# Patient Record
Sex: FEMALE | Race: WHITE
[De-identification: names, ages, dates, MRNs, and addresses within clinical notes are randomized per-mention and may not be internally consistent; named-entity substitution may affect disease eponyms.]

---

## 2018-03-06 ENCOUNTER — HOSPITAL ENCOUNTER (OUTPATIENT)
Dept: HOSPITAL 92 - BICMAMMO | Age: 48
Discharge: HOME | End: 2018-03-06
Attending: OBSTETRICS & GYNECOLOGY
Payer: COMMERCIAL

## 2018-03-06 DIAGNOSIS — Z12.31: Primary | ICD-10-CM

## 2018-03-06 PROCEDURE — 77063 BREAST TOMOSYNTHESIS BI: CPT

## 2018-03-06 PROCEDURE — 77067 SCR MAMMO BI INCL CAD: CPT

## 2018-03-20 ENCOUNTER — HOSPITAL ENCOUNTER (OUTPATIENT)
Dept: HOSPITAL 92 - BICMAMMO | Age: 48
Discharge: HOME | End: 2018-03-20
Attending: OBSTETRICS & GYNECOLOGY
Payer: COMMERCIAL

## 2018-03-20 DIAGNOSIS — N63.20: Primary | ICD-10-CM

## 2018-03-20 PROCEDURE — G0279 TOMOSYNTHESIS, MAMMO: HCPCS

## 2018-03-29 ENCOUNTER — HOSPITAL ENCOUNTER (OUTPATIENT)
Dept: HOSPITAL 92 - BICULT | Age: 48
End: 2018-03-29
Attending: OBSTETRICS & GYNECOLOGY
Payer: COMMERCIAL

## 2018-03-29 DIAGNOSIS — D24.2: Primary | ICD-10-CM

## 2018-03-29 PROCEDURE — 19083 BX BREAST 1ST LESION US IMAG: CPT

## 2018-03-29 PROCEDURE — 88305 TISSUE EXAM BY PATHOLOGIST: CPT

## 2018-03-29 PROCEDURE — 0HBU3ZX EXCISION OF LEFT BREAST, PERCUTANEOUS APPROACH, DIAGNOSTIC: ICD-10-PCS | Performed by: OBSTETRICS & GYNECOLOGY

## 2018-10-25 ENCOUNTER — HOSPITAL ENCOUNTER (OUTPATIENT)
Dept: HOSPITAL 92 - SCSULT | Age: 48
Discharge: HOME | End: 2018-10-25
Attending: INTERNAL MEDICINE
Payer: COMMERCIAL

## 2018-10-25 DIAGNOSIS — B96.81: ICD-10-CM

## 2018-10-25 DIAGNOSIS — K80.20: ICD-10-CM

## 2018-10-25 DIAGNOSIS — R14.0: Primary | ICD-10-CM

## 2018-10-25 PROCEDURE — 76705 ECHO EXAM OF ABDOMEN: CPT

## 2018-10-25 NOTE — ULT
RIGHT UPPER QUADRANT ULTRASOUND: 

 

Date: 10-25-18 

 

Comparison: None. 

 

History: Abdominal distention. 

 

Technique: Multiplanar grayscale sonographic imaging of the right upper quadrant provided. 

 

FINDINGS: 

Imaged pancreas is grossly unremarkable. The distal body and tail are obscured by bowel gas. No focal
 liver lesion or intrahepatic biliary dilatation is seen. 

 

The common bile duct measures approximately 4 mm, within normal limits. 

 

Extensive shadowing is seen in the region of the gallbladder suggesting a gallbladder filled with mul
tiple stones. The sonographer reports a negative Mak's sign. No gallbladder wall thickening or per
icholecystic fluid. 

 

Right kidney measures 12.4 cm craniocaudal dimension and demonstrates no stone, hydronephrosis or mas
s. 

 

IMPRESSION: 

Numerous gallstones fill the gallbladder. No sonographic evidence of acute cholecystitis or biliary d
ilatation. 

 

POS: [facility]

## 2018-12-10 ENCOUNTER — HOSPITAL ENCOUNTER (OUTPATIENT)
Dept: HOSPITAL 92 - LABBT | Age: 48
Discharge: HOME | End: 2018-12-10
Attending: SURGERY
Payer: COMMERCIAL

## 2018-12-10 DIAGNOSIS — Z01.812: Primary | ICD-10-CM

## 2018-12-10 DIAGNOSIS — K80.20: ICD-10-CM

## 2018-12-10 LAB
ALBUMIN SERPL BCG-MCNC: 4.1 G/DL (ref 3.5–5)
ALP SERPL-CCNC: 81 U/L (ref 40–150)
ALT SERPL W P-5'-P-CCNC: 9 U/L (ref 8–55)
ANION GAP SERPL CALC-SCNC: 10 MMOL/L (ref 10–20)
AST SERPL-CCNC: 16 U/L (ref 5–34)
BASOPHILS # BLD AUTO: 0.1 THOU/UL (ref 0–0.2)
BASOPHILS NFR BLD AUTO: 1.2 % (ref 0–1)
BILIRUB DIRECT SERPL-MCNC: 0.2 MG/DL (ref 0.1–0.3)
BILIRUB SERPL-MCNC: 0.4 MG/DL (ref 0.2–1.2)
BUN SERPL-MCNC: 12 MG/DL (ref 7–18.7)
CALCIUM SERPL-MCNC: 9.3 MG/DL (ref 7.8–10.44)
CHLORIDE SERPL-SCNC: 104 MMOL/L (ref 98–107)
CO2 SERPL-SCNC: 26 MMOL/L (ref 22–29)
CREAT CL PREDICTED SERPL C-G-VRATE: 0 ML/MIN (ref 70–130)
EOSINOPHIL # BLD AUTO: 0.8 THOU/UL (ref 0–0.7)
EOSINOPHIL NFR BLD AUTO: 10.5 % (ref 0–10)
GLUCOSE SERPL-MCNC: 97 MG/DL (ref 70–105)
HGB BLD-MCNC: 10.4 G/DL (ref 12–16)
LYMPHOCYTES # BLD: 2 THOU/UL (ref 1.2–3.4)
LYMPHOCYTES NFR BLD AUTO: 26.8 % (ref 21–51)
MCH RBC QN AUTO: 23.1 PG (ref 27–31)
MCV RBC AUTO: 75 FL (ref 78–98)
MDIFF COMPLETE?: YES
MICROCYTES BLD QL SMEAR: (no result) (100X)
MONOCYTES # BLD AUTO: 0.7 THOU/UL (ref 0.11–0.59)
MONOCYTES NFR BLD AUTO: 9 % (ref 0–10)
NEUTROPHILS # BLD AUTO: 3.8 THOU/UL (ref 1.4–6.5)
NEUTROPHILS NFR BLD AUTO: 52.5 % (ref 42–75)
PLATELET # BLD AUTO: 341 THOU/UL (ref 130–400)
POLYCHROMASIA BLD QL SMEAR: (no result) (100X)
POTASSIUM SERPL-SCNC: 4.3 MMOL/L (ref 3.5–5.1)
PREGS CONTROL BACKGROUND?: (no result)
PREGS CONTROL BAR APPEAR?: YES
RBC # BLD AUTO: 4.51 MILL/UL (ref 4.2–5.4)
SODIUM SERPL-SCNC: 136 MMOL/L (ref 136–145)
WBC # BLD AUTO: 7.3 THOU/UL (ref 4.8–10.8)

## 2018-12-10 PROCEDURE — 85025 COMPLETE CBC W/AUTO DIFF WBC: CPT

## 2018-12-10 PROCEDURE — 84703 CHORIONIC GONADOTROPIN ASSAY: CPT

## 2018-12-10 PROCEDURE — 80048 BASIC METABOLIC PNL TOTAL CA: CPT

## 2018-12-10 PROCEDURE — 80076 HEPATIC FUNCTION PANEL: CPT

## 2018-12-11 ENCOUNTER — HOSPITAL ENCOUNTER (OUTPATIENT)
Dept: HOSPITAL 92 - SDC | Age: 48
Discharge: HOME | End: 2018-12-11
Attending: SURGERY
Payer: COMMERCIAL

## 2018-12-11 VITALS — BODY MASS INDEX: 29.2 KG/M2

## 2018-12-11 DIAGNOSIS — K80.10: Primary | ICD-10-CM

## 2018-12-11 PROCEDURE — S0028 INJECTION, FAMOTIDINE, 20 MG: HCPCS

## 2018-12-11 PROCEDURE — 96374 THER/PROPH/DIAG INJ IV PUSH: CPT

## 2018-12-11 PROCEDURE — 0FT44ZZ RESECTION OF GALLBLADDER, PERCUTANEOUS ENDOSCOPIC APPROACH: ICD-10-PCS | Performed by: SURGERY

## 2018-12-11 PROCEDURE — 88304 TISSUE EXAM BY PATHOLOGIST: CPT

## 2018-12-11 NOTE — OP
DATE OF PROCEDURE:  12/11/2018



PREOPERATIVE DIAGNOSIS:  Symptomatic gallstones.



POSTOPERATIVE DIAGNOSIS:  Symptomatic gallstones.



PROCEDURE PERFORMED:  Laparoscopic cholecystectomy.



ANESTHESIA:  General.



ESTIMATED BLOOD LOSS:  Minimal.



COMPLICATION:  None.



SPECIMEN:  Gallbladder.



FINDINGS:  Chronic cholecystitis.



DESCRIPTION OF PROCEDURE:  The patient was taken to the operating room and laid

supine on the operating room table.  After general anesthetic was obtained, the

abdomen was prepped and draped in a sterile fashion.  A curved incision was made

below the umbilicus.  Cautery was used to dissect down to the umbilical fascia.

Umbilical fascia was incised and held up using a Kocher.  The abdominal cavity was

entered using a Arcelia clamp.  Holding stitch of Vicryl was placed on each side of

the fascia.  Quiñonez trocar was placed.  High-flow pneumoperitoneum was obtained.  An

upper midline 5 mm port and 2 right upper quadrant 5 mm ports were placed under

direct camera visualization.  The gallbladder was retracted from the gallbladder

fossa.  The peritoneum of the gallbladder was opened anteriorly and posteriorly.

The critical view triangle was seen showing only the cystic duct and cystic artery

branching from medial to lateral.  There were no other branching structures.  Two

clips were placed proximally on the cystic duct and one laterally.  It was cut using

laparoscopic scissors.  The cystic artery was taken in the same way.  Electrocautery

was then used to dissect the gallbladder out of the gallbladder fossa.  The

gallbladder was placed in an Endo catch bag and brought out through the Quiñonez.

There was no bleeding or bile in the liver bed.  The cystic duct stump and cystic

artery stump were intact, without evidence of extravasation or bleeding.  All port

sites were infiltrated using local anesthesia.  All ports were removed under camera

visualization.  Pneumoperitoneum was let down.  The Vicryl was used to close the

fascial defect below the umbilicus.  All incisions were irrigated and closed using

4-0 Monocryl and Dermabond.  The patient was en route to Recovery in stable

condition.  All instrument counts, needle counts and lap counts were correct. 







Job ID:  [id number]

## 2019-06-17 ENCOUNTER — HOSPITAL ENCOUNTER (OUTPATIENT)
Dept: HOSPITAL 92 - LABBT | Age: 49
Discharge: HOME | End: 2019-06-17
Attending: OBSTETRICS & GYNECOLOGY
Payer: COMMERCIAL

## 2019-06-17 DIAGNOSIS — N92.0: ICD-10-CM

## 2019-06-17 DIAGNOSIS — D25.9: ICD-10-CM

## 2019-06-17 DIAGNOSIS — Z01.812: Primary | ICD-10-CM

## 2019-06-17 LAB
HGB BLD-MCNC: 12.6 G/DL (ref 12–16)
MCH RBC QN AUTO: 26.4 PG (ref 27–31)
MCV RBC AUTO: 81.4 FL (ref 78–98)
PLATELET # BLD AUTO: 322 THOU/UL (ref 130–400)
RBC # BLD AUTO: 4.76 MILL/UL (ref 4.2–5.4)
WBC # BLD AUTO: 7.5 THOU/UL (ref 4.8–10.8)

## 2019-06-17 PROCEDURE — 86900 BLOOD TYPING SEROLOGIC ABO: CPT

## 2019-06-17 PROCEDURE — 86850 RBC ANTIBODY SCREEN: CPT

## 2019-06-17 PROCEDURE — 85027 COMPLETE CBC AUTOMATED: CPT

## 2019-06-17 PROCEDURE — 86901 BLOOD TYPING SEROLOGIC RH(D): CPT

## 2019-06-17 NOTE — HP
The patient is scheduled for surgery on 06/18.



HISTORY OF PRESENT ILLNESS:  Ms. Castle is a 49-year-old female G4, P3, A1, who has

known history of uterine fibroids.  She has been having increasingly heavier periods

along with pelvic pain and discomfort, which have worsened over the past 6 months.

She has tried Lysteda for menorrhagia with minimal improvement in her flow.  She has

had a benign endometrial biopsy in February 2019 for some irregular bleeding

associated with the fibroids.  She has now been having more abdominal and pelvic

pain associated with the uterine fibroids and is now desiring definitive surgical

therapy. 



PAST MEDICAL HISTORY:  Negative.



PAST SURGICAL HISTORY:  Previous cholecystectomy.



ALLERGIES:  SHE HAS NO KNOWN DRUG ALLERGIES.



FAMILY HISTORY:  Hypertension in her mother and bladder cancer in her father.



GYN HISTORY:  Recently, she had a Pap smear with low-grade MARGE, HPV 16, evaluated

approximately 3 months ago. 



CURRENT MEDICATIONS:  As noted, Lysteda on her menstrual cycles and over-the-counter

ibuprofen as needed for pain. 



PHYSICAL EXAMINATION:

GENERAL:  The patient's height is 5 feet 4 inches, weight 169 with a BMI of 29,

blood pressure 112/64, pulse 76, respirations 18, O2 saturation on room air is 98%. 

HEENT:  Within normal limits. 

CHEST:  Clear to auscultation. 

HEART:  Regular rate and rhythm.  S1 and S2 heart sounds.  No murmurs, rubs, or

gallops. 

ABDOMEN:  Soft, nontender.  There is a palpable mass in her pelvis, approximately

16-week size, irregular, consistent with her uterine fibroids. 

PELVIC:  Reveals the same.  Vulva, vagina had no lesions.  Cervix had no lesions.

Uterus, 16-week size, irregular and tender. 



ASSESSMENT:  This is a 49-year-old G4, P3, A1 with symptomatic 16-week uterine

fibroids.  She has tried Lysteda for menorrhagia, but also having increasing pain

and also heavy flow and is desiring definitive surgical therapy. 



PLAN:  Plan is to proceed with robotic total laparoscopic hysterectomy, bilateral

salpingectomy, and removal of the specimen with the ExCITE procedure.  We will

assess ovaries at time of surgery.  If any abnormalities or concerns exist at that

time, then also plan for removal of the ovaries.  Risks and benefits of procedure

were discussed in detail.  She is set for surgery on 06/18/2019. 







Job ID:  [id number]

## 2019-06-18 ENCOUNTER — HOSPITAL ENCOUNTER (OUTPATIENT)
Dept: HOSPITAL 92 - SDC | Age: 49
LOS: 1 days | Discharge: HOME | End: 2019-06-19
Attending: OBSTETRICS & GYNECOLOGY
Payer: COMMERCIAL

## 2019-06-18 VITALS — BODY MASS INDEX: 29.3 KG/M2

## 2019-06-18 DIAGNOSIS — N72: ICD-10-CM

## 2019-06-18 DIAGNOSIS — D25.9: Primary | ICD-10-CM

## 2019-06-18 DIAGNOSIS — N87.9: ICD-10-CM

## 2019-06-18 DIAGNOSIS — N92.0: ICD-10-CM

## 2019-06-18 LAB
BASOPHILS # BLD AUTO: 0.1 THOU/UL (ref 0–0.2)
BASOPHILS NFR BLD AUTO: 1.2 % (ref 0–1)
EOSINOPHIL # BLD AUTO: 0.2 THOU/UL (ref 0–0.7)
EOSINOPHIL NFR BLD AUTO: 3.1 % (ref 0–10)
HGB BLD-MCNC: 11.7 G/DL (ref 12–16)
LYMPHOCYTES # BLD: 2 THOU/UL (ref 1.2–3.4)
LYMPHOCYTES NFR BLD AUTO: 31 % (ref 21–51)
MCH RBC QN AUTO: 26 PG (ref 27–31)
MCV RBC AUTO: 81 FL (ref 78–98)
MONOCYTES # BLD AUTO: 0.6 THOU/UL (ref 0.11–0.59)
MONOCYTES NFR BLD AUTO: 10.2 % (ref 0–10)
NEUTROPHILS # BLD AUTO: 3.4 THOU/UL (ref 1.4–6.5)
NEUTROPHILS NFR BLD AUTO: 54.6 % (ref 42–75)
PLATELET # BLD AUTO: 298 THOU/UL (ref 130–400)
PREGU CONTROL BACKGROUND?: (no result)
PREGU CONTROL BAR APPEAR?: YES
RBC # BLD AUTO: 4.5 MILL/UL (ref 4.2–5.4)
WBC # BLD AUTO: 6.3 THOU/UL (ref 4.8–10.8)

## 2019-06-18 PROCEDURE — 81025 URINE PREGNANCY TEST: CPT

## 2019-06-18 PROCEDURE — 86901 BLOOD TYPING SEROLOGIC RH(D): CPT

## 2019-06-18 PROCEDURE — 85027 COMPLETE CBC AUTOMATED: CPT

## 2019-06-18 PROCEDURE — 88307 TISSUE EXAM BY PATHOLOGIST: CPT

## 2019-06-18 PROCEDURE — 86850 RBC ANTIBODY SCREEN: CPT

## 2019-06-18 PROCEDURE — S0028 INJECTION, FAMOTIDINE, 20 MG: HCPCS

## 2019-06-18 PROCEDURE — 36415 COLL VENOUS BLD VENIPUNCTURE: CPT

## 2019-06-18 PROCEDURE — 86900 BLOOD TYPING SEROLOGIC ABO: CPT

## 2019-06-18 PROCEDURE — 85025 COMPLETE CBC W/AUTO DIFF WBC: CPT

## 2019-06-18 NOTE — OP
DATE OF PROCEDURE:  06/18/2019



PREOPERATIVE DIAGNOSES:  

1. A 49-year-old female G4, P3, A1 with symptomatic 16 or 18-week uterine fibroids.

2. Menorrhagia.

3. Pelvic pain.

4. 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



POSTOPERATIVE DIAGNOSES:  

1. A 49-year-old female G4, P3, A1 with symptomatic 16 or 18-week uterine fibroids.

2. Menorrhagia.

3. Pelvic pain.

4. 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



PROCEDURES PERFORMED:  Robotic total laparoscopic hysterectomy with bilateral

salpingectomy with ExCITE removal of the uterine specimen. 



ASSISTANT SURGEON:  Doreen Beauchamp MD



ANESTHESIA:  General endotracheal.



ESTIMATED BLOOD LOSS:  100 mL.



COMPLICATIONS:  None.



COUNTS:  Correct x2.



ANTIBIOTICS:  2 g Ancef, on-call to OR.



PATHOLOGY:  Will be the uterine tissue, cervix and bilateral fallopian tubes.



FINDINGS:  

1. Normal bilateral fallopian tubes and ovaries noted.

2. A 16 or 18-week size uterus with multiple small-to-medium size uterine fibroids,

both intramural and subserosal noted. 

3. Clear urine present in Rios catheter postprocedure with also bladder be in

watertight to fluid distention greater than 300 mL postprocedure. 

4. Bilateral ureteral peristalsis noted postprocedure.



DISPOSITION:  Recovery room, stable.



DESCRIPTION OF PROCEDURE:  The patient previously received informed consent in

regard to surgery.  She was taken back to the operating room, where she received a

general endotracheal anesthetic agent without complications.  She was placed in

dorsal lithotomy position with the use of Hector stirrups and prepped and draped in

usual sterile fashion.  A side-arm speculum was placed in vagina.  The anterior lip

of the cervix was grasped with a single-tooth tenaculum.  The uterus sounded to 12

cm and a size 12 cm SARAH uterine manipulator with a 4.0 cm cervical cup was placed

in usual fashion.  Tenaculum and speculum were removed.  Attention was then turned

to the abdomen, where perspective trocar sites were infiltrated with 0.5% Marcaine

with epinephrine.  A 12 mm supraumbilical incision was made.  Veress needle was

entered in the peritoneal cavity and the patient's pressure was noted to be less

than 5 mmHg.  The abdomen was insufflated with the patient's pressure of 15

approximately 5 L of carbon dioxide gas.  The Veress needle was then removed and a

size 12 mm trocar was placed through the incision site.  The robotic laparoscope was

then introduced through the trocar sleeve confirming proper entry.  Additional

bilateral lower quadrant 8 mm robotic trocars were placed under laparoscopic

guidance along with the right upper quadrant assistant port.  The patient was placed

in Trendelenburg position.  The fascial incision in the supraumbilical region was

extended to 2.5 cm, allowing for placement of the small Enrico O retractor with the

small GelPOINT  device.  The laparoscope trocar had been placed through the

GelPOINT prior to attaching the cap of the GelPOINT.  The laparoscope was then

reintroduced through the trocar sleeve.  The pelvis was inspected with the

previously mentioned findings.  The uterus was elevated by my assistant and the

posterior cul-de-sac was also inspected and then we felt there was a go for the

robotic procedure.  I broke scrub and then proceeded to carry out the surgery from

the operative robotic console while my assistant has remained at the bedside.  The

left fallopian tube was grasped by my assistant and the mesosalpinx of the left

fallopian tube was coagulated with bipolar fenestrated cautery, incised with

monopolar scissors, and this tube was removed through the right upper quadrant port

site.  The left utero-ovarian ligament was then coagulated with bipolar fenestrated

cautery and then this was transected with monopolar cautery.  My assistant continued

to direct the large uterus away from the pelvic sidewall, which enabled me to

continue coagulation and transection of the broad ligament until the left round

ligament was reached.  It was coagulated and transected.  I then entered into the

anterior cul-de-sac, dissecting the vesicouterine peritoneum in a layering technique

dissecting the bladder past the cervical vaginal angle and also skeletonizing the

large uterine vessels that were noted.  This allowed for retraction of the iliac

vessels and the ureter way lateral from the specimen.  The internal cervical os

region had been cleared and this was coagulated several times to secure some uterine

vessels.  The vesicouterine peritoneum continued to be dissected in layering

technique both sharply and bluntly past the cervical vaginal angle, it was

delineated by the cervical cup.  We then proceeded to grasp the right fallopian tube

by my assistant.  Mesosalpinx again was cauterized and transected.  The tube was

removed in the right upper quadrant port.  The right utero-ovarian vessels were then

coagulated and transected and serial coagulation of the broad ligament hugging close

to a large uterus was carried out till the right round ligament was reached.  It was

coagulated and transected.  The anterior leaf of the broad ligament was entered and

the vesicouterine peritoneum again was incised and transected and then sharply

dissected off the lower uterine segment past the cervical vaginal angles.  The

uterine vessels again were skeletonized.  Again, the location of the right ureter

was noted to be way lateral to the uterine vessel sites for coagulation and this had

also been confirmed in the left ureter prior to coagulation of the vessels.  The

uterine vessels again were coagulated after skeletonizing them at the internal

cervical os region. 



Due the bulking nature of the uterus, we decided to proceed first by the posterior

colpotomy, the large uterus was then flexed anteriorly by my assistant with a

single-tooth tenaculum in the right upper quadrant port and then manipulating with

the SARAH uterine manipulator.  The posterior cuff field was visualized and the

monopolar scissors were then coagulated on cut from 6 to 3 o'clock and then 6 to 7

o'clock position.  It was difficult to see around the left side due to the position

from the uterine fibroid.  Therefore, we went anteriorly and completed from 12 to 3,

which in securing the vessels as we did this with bipolar fenestrated cautery and

then from 12 to 9 o'clock position.  We had had some difficulty with

pneumoperitoneum prior to this and this was trouble shot to be leaking from the

uterine manipulator, which was fixed appropriately and then, we had resumed

resumption of normal pneumoperitoneum in the 15 range.  This also aided in excellent

visualization for completion of the vaginal cuff incision.  Once this had been

accomplished, the uterine specimen was taken off the SARAH uterine manipulator and

placed in the upper abdomen.  The vaginal cuff was inspected and the area that was

oozing were made hemostatic with bipolar fenestrated cautery.  My monopolar scissor

had been switched out for a needle .  STRATAFIX suture had been brought into

the operative field by my assistant and I then proceeded to close the vaginal cuff

in a full-thickness closure starting the right angle, then back to the left angle,

and back towards the midline with good hemostasis confirmed.  The excess suture

string and the needle were then removed in the right upper quadrant assistant port.

We then brought the medium-size Aces bag that had been previously folded in

accordion fashion and tagged with sutures that was up in the right upper quadrant.

We brought that down into the pelvis with my bipolar fenestrated cautery device and

the needle  and the Ada grasper by my assistant.  We positioned the bag,

this in the pelvis and then brought the uterine specimen overlying this.  My

assistant then cut the stay sutures off the Aces bag, which open the bag and allowed

for us to deliver the large uterus specimen into the Aces bag.  I was able to grasp

the suture that had been tied to the large bag and pulled it through the opposite

ended loop that had been secured on the other rim of the bag, and this allowed for

securing the specimen within the bag and then my assistant grabbed with a Ada

instrument, the tied suture through the loop pulling that up towards the GelPOINT

near the umbilicus.  Once we had gotten the bag with the specimen up through the

GelPOINT opening, I broke off the operative console and then, we undocked the robot.

 The GelPOINT cap was removed and then we delivered the large Aces bag through the

fascial defect in the supraumbilical region.  The specimen remained inside the bag.

The small Enrico O retractor then was placed with inside the Aces bag to protect the

bag as we did the ExCITE portion of the procedure with the morcellation.  The

specimen was grasped repetitively with Vicente thyroid clamps and with the C-incision

technique with a scalpel, we delivered the large specimen in a morcellating fashion.

 Once the specimen had been all removed from the bag, the Aces bag was then removed

from the fascial defect.  It was noted to be intact.  The Enrico O retractor, small

retractor was also removed at this time.  The fascia was then closed in the

umbilical region with a running 0 Vicryl suture and good approximation of the fascia

was confirmed.  The remainder of the trocar sites were then closed with a 4-0

Monocryl suture in subcuticular stitch fashion with Dermabond.  I then checked also

the vaginal cuff with a sponge stick and hemostasis vaginally.  The vaginal cuff was

noted.  The patient was then awakened from anesthesia and transferred to Recovery in

stable condition. 





Job ID:  [id number]

## 2019-06-19 VITALS — TEMPERATURE: 98.6 F | DIASTOLIC BLOOD PRESSURE: 79 MMHG | SYSTOLIC BLOOD PRESSURE: 128 MMHG

## 2019-06-19 LAB
HGB BLD-MCNC: 10.4 G/DL (ref 12–16)
MCH RBC QN AUTO: 26.5 PG (ref 27–31)
MCV RBC AUTO: 80.6 FL (ref 78–98)
PLATELET # BLD AUTO: 269 THOU/UL (ref 130–400)
RBC # BLD AUTO: 3.93 MILL/UL (ref 4.2–5.4)
WBC # BLD AUTO: 9.4 THOU/UL (ref 4.8–10.8)

## 2019-06-19 PROCEDURE — 0UT94ZZ RESECTION OF UTERUS, PERCUTANEOUS ENDOSCOPIC APPROACH: ICD-10-PCS | Performed by: OBSTETRICS & GYNECOLOGY

## 2019-06-19 PROCEDURE — 0UT74ZZ RESECTION OF BILATERAL FALLOPIAN TUBES, PERCUTANEOUS ENDOSCOPIC APPROACH: ICD-10-PCS | Performed by: OBSTETRICS & GYNECOLOGY

## 2019-06-19 NOTE — PDOC.EVN
Event Note





- Event Note


Event Note: 





Tolerating diet. Ambulating. Voiding. Good pain control..


O: 98.6 P75  128/79. HCT 31.7


ABDOMEN soft/non distended. Trochar sites clean and dry and intact.


A/P:Post op day 1 from robotic tlh with EXCITE. Doing well. Discharge home. F/u 

2 and 6 weeks. Pathology is pending.

## 2019-06-20 NOTE — DIS
DATE OF ADMISSION:  06/18/2019



DATE OF DISCHARGE:  06/19/2019



DIAGNOSES:  

1. Symptomatic uterine fibroids.

2. Menorrhagia.

3. Pelvic pain.

4. Cervical dysplasia.



PROCEDURES PERFORMED:  Robotic total laparoscopic hysterectomy, bilateral

salpingectomy with removal of uterus via ExCITE procedure. 



SUMMARY HOSPITAL COURSE:  Ms. Castle is a 49-year-old female, who is G4, P3, A1

with symptomatic large 18 to 20 week uterine fibroids.  She is having menorrhagia

and pelvic pain.  She has also had recent diagnosis cervical dysplasia with HPV 16

infection.  She had desired definitive surgical therapy.  She underwent an

uncomplicated robotic hysterectomy with ExCITE for removal of the large uterine

specimen.  Postoperatively, she has done well.  She was ambulating and voiding

without difficulty on the evening of postop day #0.  Vital signs have remained

stable.  Hematocrit 31.7% on postop day #1.  She is tolerating regular diet, has

good pain control and is afebrile.  Plan is for discharge home.  Follow up pathology

when available. 



DISCHARGE MEDICATIONS:  

1. Over-the-counter Advil 400 mg q.4 hours as needed for pain.

2. She may also use Tylenol 1000 mg q.6 hours for breakthrough pain.

3. She was given tramadol 50 mg q.6 hours p.r.n. pain.



FOLLOWUP:  She is to follow up in 2 and 6 weeks.







Job ID:  [id number]

## 2019-08-16 ENCOUNTER — HOSPITAL ENCOUNTER (OUTPATIENT)
Dept: HOSPITAL 92 - BICMAMMO | Age: 49
Discharge: HOME | End: 2019-08-16
Attending: OBSTETRICS & GYNECOLOGY
Payer: COMMERCIAL

## 2019-08-16 DIAGNOSIS — Z12.31: Primary | ICD-10-CM

## 2019-08-16 PROCEDURE — 77067 SCR MAMMO BI INCL CAD: CPT

## 2019-08-16 PROCEDURE — 77063 BREAST TOMOSYNTHESIS BI: CPT

## 2019-08-20 NOTE — MMO
Bilateral MAMMO Bilat Screen [facility].

 

CLINICAL HISTORY:

Patient is 49 years old and is seen for screening. The patient has no family

history of breast cancer.  The patient has no personal history of cancer. The

patient has a history of left Ultrasound Guided Core Biopsy in March, 2018.

 

VIEWS:

The views performed were:  bilateral craniocaudal with tomosynthesis and

bilateral mediolateral oblique with tomosynthesis.

 

FILMS COMPARED:

The present examination has been compared to prior imaging studies performed at

[facility] on 03/05/2015, 05/11/2016, 03/06/2018 and 03/20/2018.

 

MAMMOGRAM FINDINGS:

The breasts are heterogeneously dense, which could obscure a lesion on

mammography.

 

Finding 1:  There is a stable mass with associated biopsy clip seen in the left

breast at 1 o'clock.

 

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

 

Finding 2:  There is a stable lobular mass with associated benign appearing

calcifications seen in the posterior inner region of the left breast.

 

There are no suspicious masses, suspicious calcifications, or new areas of

architectural distortion.

 

IMPRESSION:

THERE IS NO MAMMOGRAPHIC EVIDENCE OF MALIGNANCY.

 

A ROUTINE FOLLOW-UP MAMMOGRAM IN 1 YEAR IS RECOMMENDED.

 

THE RESULTS OF THIS EXAM WERE SENT TO THE PATIENT.

 

ACR BI-RADS Category 2 - Benign finding

 

MAMMOGRAPHY NOTE:

 1. A negative mammogram report should not delay a biopsy if a dominant of

 clinically suspicious mass is present.

 2. Approximately 10% to 15% of breast cancers are not detected by

 mammography.

 3. Adenosis and dense breasts may obscure an underlying neoplasm.

 

 

Reported by: TIMO BURNHAM MD    Electonically Signed: [id number]

## 2020-09-10 ENCOUNTER — HOSPITAL ENCOUNTER (OUTPATIENT)
Dept: HOSPITAL 92 - BICMAMMO | Age: 50
Discharge: HOME | End: 2020-09-10
Attending: OBSTETRICS & GYNECOLOGY
Payer: COMMERCIAL

## 2020-09-10 DIAGNOSIS — Z12.31: Primary | ICD-10-CM

## 2020-09-10 PROCEDURE — 77067 SCR MAMMO BI INCL CAD: CPT

## 2020-09-10 PROCEDURE — 77063 BREAST TOMOSYNTHESIS BI: CPT

## 2020-09-10 NOTE — MMO
Bilateral MAMMO Bilat Screen [facility].

 

CLINICAL HISTORY:

Patient is 50 years old and is seen for screening. The patient has no family

history of breast cancer.  The patient has no personal history of cancer. The

patient has a history of left Ultrasound Guided Core Biopsy in March, 2018.

 

VIEWS:

The views performed were:  bilateral craniocaudal with tomosynthesis and

bilateral mediolateral oblique with tomosynthesis.

 

FILMS COMPARED:

The present examination has been compared to prior imaging studies performed at

[facility] on 05/11/2016, 03/06/2018, 03/20/2018 and 08/16/2019.

 

This study has been interpreted with the assistance of computer-aided detection.

 

MAMMOGRAM FINDINGS:

The breasts are heterogeneously dense, which could obscure a lesion on

mammography.

 

Left breast masses and biopsy clip are again seen.  Benign calcifications are

noted bilaterally.

 

There are no suspicious masses, suspicious calcifications, or new areas of

architectural distortion.

 

IMPRESSION:

THERE IS NO MAMMOGRAPHIC EVIDENCE OF MALIGNANCY.

 

A ROUTINE FOLLOW-UP MAMMOGRAM IN 1 YEAR IS RECOMMENDED.

 

THE RESULTS OF THIS EXAM WERE SENT TO THE PATIENT.

 

ACR BI-RADS Category 2 - Benign finding

 

MAMMOGRAPHY NOTE:

 1. A negative mammogram report should not delay a biopsy if a dominant of

 clinically suspicious mass is present.

 2. Approximately 10% to 15% of breast cancers are not detected by

 mammography.

 3. Adenosis and dense breasts may obscure an underlying neoplasm.

 

 

Reported by: ART HERNANDEZ MD

Electonically Signed: [id number]

## 2021-11-16 ENCOUNTER — HOSPITAL ENCOUNTER (OUTPATIENT)
Dept: HOSPITAL 92 - BICMAMMO | Age: 51
Discharge: HOME | End: 2021-11-16
Attending: OBSTETRICS & GYNECOLOGY
Payer: COMMERCIAL

## 2021-11-16 DIAGNOSIS — Z12.31: Primary | ICD-10-CM

## 2021-11-16 PROCEDURE — 77063 BREAST TOMOSYNTHESIS BI: CPT

## 2021-11-16 PROCEDURE — 77067 SCR MAMMO BI INCL CAD: CPT

## 2022-01-26 ENCOUNTER — HOSPITAL ENCOUNTER (EMERGENCY)
Dept: HOSPITAL 92 - CSHERS | Age: 52
Discharge: HOME | End: 2022-01-26
Payer: COMMERCIAL

## 2022-01-26 DIAGNOSIS — N39.0: ICD-10-CM

## 2022-01-26 DIAGNOSIS — N83.202: Primary | ICD-10-CM

## 2022-01-26 LAB
ALBUMIN SERPL BCG-MCNC: 4.1 G/DL (ref 3.5–5)
ALP SERPL-CCNC: 79 U/L (ref 40–110)
ALT SERPL W P-5'-P-CCNC: 13 U/L (ref 8–55)
ANION GAP SERPL CALC-SCNC: 10 MMOL/L (ref 10–20)
AST SERPL-CCNC: 19 U/L (ref 5–34)
BACTERIA UR QL AUTO: (no result) HPF
BASOPHILS # BLD AUTO: 0.1 10X3/UL (ref 0–0.2)
BASOPHILS NFR BLD AUTO: 1.2 % (ref 0–2)
BILIRUB SERPL-MCNC: 0.6 MG/DL (ref 0.2–1.2)
BUN SERPL-MCNC: 12 MG/DL (ref 9.8–20.1)
CALCIUM SERPL-MCNC: 9 MG/DL (ref 7.8–10.44)
CHLORIDE SERPL-SCNC: 102 MMOL/L (ref 98–107)
CO2 SERPL-SCNC: 26 MMOL/L (ref 22–29)
CREAT CL PREDICTED SERPL C-G-VRATE: 0 ML/MIN (ref 70–130)
EOSINOPHIL # BLD AUTO: 0.3 10X3/UL (ref 0–0.5)
EOSINOPHIL NFR BLD AUTO: 2.7 % (ref 0–6)
GLOBULIN SER CALC-MCNC: 3.2 G/DL (ref 2.4–3.5)
GLUCOSE SERPL-MCNC: 109 MG/DL (ref 70–105)
HGB BLD-MCNC: 14.2 G/DL (ref 12–15.5)
LIPASE SERPL-CCNC: 25 U/L (ref 8–78)
LYMPHOCYTES NFR BLD AUTO: 14.3 % (ref 18–47)
MCH RBC QN AUTO: 30 PG (ref 27–33)
MCV RBC AUTO: 89.9 FL (ref 81.6–98.3)
MONOCYTES # BLD AUTO: 0.7 10X3/UL (ref 0–1.1)
MONOCYTES NFR BLD AUTO: 6.5 % (ref 0–10)
NEUTROPHILS # BLD AUTO: 7.5 10X3/UL (ref 1.5–8.4)
NEUTROPHILS NFR BLD AUTO: 74.7 % (ref 40–75)
PLATELET # BLD AUTO: 285 10X3/UL (ref 150–450)
POTASSIUM SERPL-SCNC: 4.3 MMOL/L (ref 3.5–5.1)
PREGU CONTROL BACKGROUND?: (no result)
PREGU CONTROL BAR APPEAR?: YES
PROT UR STRIP.AUTO-MCNC: 30 MG/DL
RBC # BLD AUTO: 4.73 10X6/UL (ref 3.9–5.03)
RBC UR QL AUTO: (no result) HPF (ref 0–3)
SODIUM SERPL-SCNC: 134 MMOL/L (ref 136–145)
SP GR UR STRIP: 1 (ref 1–1.04)
SP GR UR STRIP: 1.02 (ref 1–1.04)
WBC # BLD AUTO: 10.1 10X3/UL (ref 3.5–10.5)
WBC UR QL AUTO: (no result) HPF (ref 0–3)
YEAST # UR AUTO: (no result) HPF

## 2022-01-26 PROCEDURE — 85025 COMPLETE CBC W/AUTO DIFF WBC: CPT

## 2022-01-26 PROCEDURE — 80053 COMPREHEN METABOLIC PANEL: CPT

## 2022-01-26 PROCEDURE — 81015 MICROSCOPIC EXAM OF URINE: CPT

## 2022-01-26 PROCEDURE — 81025 URINE PREGNANCY TEST: CPT

## 2022-01-26 PROCEDURE — 96374 THER/PROPH/DIAG INJ IV PUSH: CPT

## 2022-01-26 PROCEDURE — 74176 CT ABD & PELVIS W/O CONTRAST: CPT

## 2022-01-26 PROCEDURE — 81003 URINALYSIS AUTO W/O SCOPE: CPT

## 2022-01-26 PROCEDURE — 76856 US EXAM PELVIC COMPLETE: CPT

## 2022-01-26 PROCEDURE — 94760 N-INVAS EAR/PLS OXIMETRY 1: CPT

## 2022-01-26 PROCEDURE — 83690 ASSAY OF LIPASE: CPT

## 2022-01-26 PROCEDURE — 87086 URINE CULTURE/COLONY COUNT: CPT

## 2022-12-21 ENCOUNTER — HOSPITAL ENCOUNTER (OUTPATIENT)
Dept: HOSPITAL 92 - CSHMAMMO | Age: 52
Discharge: HOME | End: 2022-12-21
Attending: OBSTETRICS & GYNECOLOGY
Payer: COMMERCIAL

## 2022-12-21 DIAGNOSIS — Z12.31: Primary | ICD-10-CM

## 2022-12-21 PROCEDURE — 77067 SCR MAMMO BI INCL CAD: CPT

## 2022-12-21 PROCEDURE — 77063 BREAST TOMOSYNTHESIS BI: CPT
